# Patient Record
Sex: FEMALE | Race: WHITE | Employment: FULL TIME | URBAN - METROPOLITAN AREA
[De-identification: names, ages, dates, MRNs, and addresses within clinical notes are randomized per-mention and may not be internally consistent; named-entity substitution may affect disease eponyms.]

---

## 2018-05-02 ENCOUNTER — OFFICE VISIT (OUTPATIENT)
Dept: SURGERY | Age: 28
End: 2018-05-02
Payer: COMMERCIAL

## 2018-05-02 VITALS
DIASTOLIC BLOOD PRESSURE: 68 MMHG | SYSTOLIC BLOOD PRESSURE: 98 MMHG | HEIGHT: 68 IN | TEMPERATURE: 98.9 F | BODY MASS INDEX: 17.13 KG/M2 | OXYGEN SATURATION: 98 % | HEART RATE: 69 BPM | WEIGHT: 113 LBS

## 2018-05-02 DIAGNOSIS — S09.92XA NASAL TRAUMA, INITIAL ENCOUNTER: Primary | ICD-10-CM

## 2018-05-02 PROCEDURE — G8427 DOCREV CUR MEDS BY ELIG CLIN: HCPCS | Performed by: PLASTIC SURGERY

## 2018-05-02 PROCEDURE — 99204 OFFICE O/P NEW MOD 45 MIN: CPT | Performed by: PLASTIC SURGERY

## 2018-05-02 PROCEDURE — 1036F TOBACCO NON-USER: CPT | Performed by: PLASTIC SURGERY

## 2018-05-02 PROCEDURE — G8419 CALC BMI OUT NRM PARAM NOF/U: HCPCS | Performed by: PLASTIC SURGERY

## 2018-05-09 ENCOUNTER — TELEPHONE (OUTPATIENT)
Dept: SURGERY | Age: 28
End: 2018-05-09

## 2018-05-12 ENCOUNTER — HOSPITAL ENCOUNTER (OUTPATIENT)
Dept: CT IMAGING | Age: 28
Discharge: HOME | End: 2018-05-14
Payer: COMMERCIAL

## 2018-05-12 DIAGNOSIS — S09.92XA NASAL TRAUMA, INITIAL ENCOUNTER: ICD-10-CM

## 2018-05-12 PROCEDURE — 70486 CT MAXILLOFACIAL W/O DYE: CPT

## 2018-05-16 ENCOUNTER — OFFICE VISIT (OUTPATIENT)
Dept: SURGERY | Age: 28
End: 2018-05-16
Payer: COMMERCIAL

## 2018-05-16 VITALS
WEIGHT: 113 LBS | HEART RATE: 68 BPM | SYSTOLIC BLOOD PRESSURE: 98 MMHG | HEIGHT: 68 IN | DIASTOLIC BLOOD PRESSURE: 60 MMHG | BODY MASS INDEX: 17.13 KG/M2 | OXYGEN SATURATION: 98 %

## 2018-05-16 DIAGNOSIS — S09.92XD NASAL TRAUMA, SUBSEQUENT ENCOUNTER: Primary | ICD-10-CM

## 2018-05-16 PROCEDURE — G8419 CALC BMI OUT NRM PARAM NOF/U: HCPCS | Performed by: PLASTIC SURGERY

## 2018-05-16 PROCEDURE — 99212 OFFICE O/P EST SF 10 MIN: CPT | Performed by: PLASTIC SURGERY

## 2018-05-16 PROCEDURE — G8427 DOCREV CUR MEDS BY ELIG CLIN: HCPCS | Performed by: PLASTIC SURGERY

## 2018-05-16 PROCEDURE — 1036F TOBACCO NON-USER: CPT | Performed by: PLASTIC SURGERY

## 2018-05-22 ENCOUNTER — HOSPITAL ENCOUNTER (OUTPATIENT)
Age: 28
Discharge: HOME | End: 2018-05-24
Payer: COMMERCIAL

## 2018-05-22 ENCOUNTER — OFFICE VISIT (OUTPATIENT)
Dept: FAMILY MEDICINE CLINIC | Age: 28
End: 2018-05-22
Payer: COMMERCIAL

## 2018-05-22 VITALS
BODY MASS INDEX: 17.43 KG/M2 | HEART RATE: 50 BPM | SYSTOLIC BLOOD PRESSURE: 106 MMHG | HEIGHT: 68 IN | OXYGEN SATURATION: 97 % | WEIGHT: 115 LBS | RESPIRATION RATE: 20 BRPM | DIASTOLIC BLOOD PRESSURE: 60 MMHG

## 2018-05-22 DIAGNOSIS — R53.83 FATIGUE, UNSPECIFIED TYPE: Primary | ICD-10-CM

## 2018-05-22 DIAGNOSIS — R53.83 FATIGUE, UNSPECIFIED TYPE: ICD-10-CM

## 2018-05-22 DIAGNOSIS — J30.2 ACUTE SEASONAL ALLERGIC RHINITIS, UNSPECIFIED TRIGGER: ICD-10-CM

## 2018-05-22 LAB
BASOPHILS ABSOLUTE: 0.02 E9/L (ref 0–0.2)
BASOPHILS RELATIVE PERCENT: 0.4 % (ref 0–2)
EOSINOPHILS ABSOLUTE: 0.08 E9/L (ref 0.05–0.5)
EOSINOPHILS RELATIVE PERCENT: 1.8 % (ref 0–6)
HCT VFR BLD CALC: 38.4 % (ref 34–48)
HEMOGLOBIN: 12.1 G/DL (ref 11.5–15.5)
IMMATURE GRANULOCYTES #: 0 E9/L
IMMATURE GRANULOCYTES %: 0 % (ref 0–5)
LYMPHOCYTES ABSOLUTE: 2 E9/L (ref 1.5–4)
LYMPHOCYTES RELATIVE PERCENT: 44.7 % (ref 20–42)
MCH RBC QN AUTO: 30.3 PG (ref 26–35)
MCHC RBC AUTO-ENTMCNC: 31.5 % (ref 32–34.5)
MCV RBC AUTO: 96 FL (ref 80–99.9)
MONOCYTES ABSOLUTE: 0.29 E9/L (ref 0.1–0.95)
MONOCYTES RELATIVE PERCENT: 6.5 % (ref 2–12)
NEUTROPHILS ABSOLUTE: 2.08 E9/L (ref 1.8–7.3)
NEUTROPHILS RELATIVE PERCENT: 46.6 % (ref 43–80)
PDW BLD-RTO: 12.1 FL (ref 11.5–15)
PLATELET # BLD: 339 E9/L (ref 130–450)
PMV BLD AUTO: 9.8 FL (ref 7–12)
RBC # BLD: 4 E12/L (ref 3.5–5.5)
WBC # BLD: 4.5 E9/L (ref 4.5–11.5)

## 2018-05-22 PROCEDURE — 85025 COMPLETE CBC W/AUTO DIFF WBC: CPT

## 2018-05-22 PROCEDURE — 1036F TOBACCO NON-USER: CPT | Performed by: FAMILY MEDICINE

## 2018-05-22 PROCEDURE — 84443 ASSAY THYROID STIM HORMONE: CPT

## 2018-05-22 PROCEDURE — 84703 CHORIONIC GONADOTROPIN ASSAY: CPT

## 2018-05-22 PROCEDURE — 36415 COLL VENOUS BLD VENIPUNCTURE: CPT | Performed by: FAMILY MEDICINE

## 2018-05-22 PROCEDURE — G8427 DOCREV CUR MEDS BY ELIG CLIN: HCPCS | Performed by: FAMILY MEDICINE

## 2018-05-22 PROCEDURE — 82306 VITAMIN D 25 HYDROXY: CPT

## 2018-05-22 PROCEDURE — G8419 CALC BMI OUT NRM PARAM NOF/U: HCPCS | Performed by: FAMILY MEDICINE

## 2018-05-22 PROCEDURE — 99213 OFFICE O/P EST LOW 20 MIN: CPT | Performed by: FAMILY MEDICINE

## 2018-05-22 PROCEDURE — 80053 COMPREHEN METABOLIC PANEL: CPT

## 2018-05-22 RX ORDER — NORETHINDRONE ACETATE AND ETHINYL ESTRADIOL 1MG-20(21)
1 KIT ORAL DAILY
COMMUNITY
End: 2019-07-08

## 2018-05-22 RX ORDER — FLUTICASONE PROPIONATE 50 MCG
1 SPRAY, SUSPENSION (ML) NASAL DAILY
Qty: 1 BOTTLE | Refills: 3 | Status: SHIPPED | OUTPATIENT
Start: 2018-05-22 | End: 2018-10-09 | Stop reason: ALTCHOICE

## 2018-05-22 ASSESSMENT — ENCOUNTER SYMPTOMS
FACIAL SWELLING: 0
VOMITING: 0
PHOTOPHOBIA: 0
WHEEZING: 0
BACK PAIN: 0
BLOOD IN STOOL: 0
APNEA: 0
CHEST TIGHTNESS: 0
COUGH: 0
SINUS PRESSURE: 0
ALLERGIC/IMMUNOLOGIC NEGATIVE: 1
NAUSEA: 0
DIARRHEA: 0
COLOR CHANGE: 0
SORE THROAT: 0
SHORTNESS OF BREATH: 0
RHINORRHEA: 1
ABDOMINAL PAIN: 0

## 2018-05-23 LAB
ALBUMIN SERPL-MCNC: 4.1 G/DL (ref 3.5–5.2)
ALP BLD-CCNC: 26 U/L (ref 35–104)
ALT SERPL-CCNC: 11 U/L (ref 0–32)
ANION GAP SERPL CALCULATED.3IONS-SCNC: 13 MMOL/L (ref 7–16)
AST SERPL-CCNC: 15 U/L (ref 0–31)
BILIRUB SERPL-MCNC: 0.3 MG/DL (ref 0–1.2)
BUN BLDV-MCNC: 10 MG/DL (ref 6–20)
CALCIUM SERPL-MCNC: 9.3 MG/DL (ref 8.6–10.2)
CHLORIDE BLD-SCNC: 102 MMOL/L (ref 98–107)
CO2: 25 MMOL/L (ref 22–29)
CREAT SERPL-MCNC: 0.6 MG/DL (ref 0.5–1)
GFR AFRICAN AMERICAN: >60
GFR NON-AFRICAN AMERICAN: >60 ML/MIN/1.73
GLUCOSE BLD-MCNC: 83 MG/DL (ref 74–109)
HCG QUALITATIVE: NEGATIVE
POTASSIUM SERPL-SCNC: 4.3 MMOL/L (ref 3.5–5)
SODIUM BLD-SCNC: 140 MMOL/L (ref 132–146)
TOTAL PROTEIN: 6.8 G/DL (ref 6.4–8.3)
TSH SERPL DL<=0.05 MIU/L-ACNC: 1.39 UIU/ML (ref 0.27–4.2)
VITAMIN D 25-HYDROXY: 28 NG/ML (ref 30–100)

## 2018-07-20 ENCOUNTER — OFFICE VISIT (OUTPATIENT)
Dept: SURGERY | Age: 28
End: 2018-07-20
Payer: COMMERCIAL

## 2018-07-20 VITALS — WEIGHT: 121 LBS | HEIGHT: 68 IN | RESPIRATION RATE: 16 BRPM | BODY MASS INDEX: 18.34 KG/M2

## 2018-07-20 DIAGNOSIS — S09.92XD NASAL TRAUMA, SUBSEQUENT ENCOUNTER: Primary | ICD-10-CM

## 2018-07-20 PROCEDURE — G8427 DOCREV CUR MEDS BY ELIG CLIN: HCPCS | Performed by: PLASTIC SURGERY

## 2018-07-20 PROCEDURE — 99212 OFFICE O/P EST SF 10 MIN: CPT | Performed by: PLASTIC SURGERY

## 2018-07-20 PROCEDURE — G8419 CALC BMI OUT NRM PARAM NOF/U: HCPCS | Performed by: PLASTIC SURGERY

## 2018-07-20 PROCEDURE — 1036F TOBACCO NON-USER: CPT | Performed by: PLASTIC SURGERY

## 2018-07-26 NOTE — PROGRESS NOTES
Subjective: Follow up today to discuss up coming post-traumatic septorhinoplasty surgery. 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 She understands that this is not a guarantee of results. Have have explained to her in detail my intended pland for how to correct her external nasal deformities and her deviated septum. She did question if she could only reduce her nasal hump. I did educate her that since her nose is slightly long-appearing at baseline, reducing the hump will create the appearance of a longer nose. Furthermore, deepening the radix is necessary as well, or the nasal dorsum would have a linear approach to the the forehead giving an unnatural appearance. I further educated her that cephalic rotation of the tip was necessary to create a slight shortening of the nose. I also explained why osteotomies were necessary as well. She was hoping to reduce the hump only and stated that she \"did not want her appearance to change that much\"    I educated her that this is the technique that I am most comfortable with secondary to my training. Deviation from my plan will lead to unpredictable results and in my opinion an unbalanced nose. I did tell her to get a second opinion from another rhinoplasty surgeon if she wishes. She also wanted to know the exact code to be used in billing and what she would be responsible for. I did let her know my intended code, but that will not be finalized until the procedure is complete. She would have to contact her insurance company for more detailed benefits descriptions. At the end of the 30 min meeting, she seemed comfortable with my surgical plan and intended recovery course.      The risks, benefits and options were discussed with the pt. The risks included but not limited to pain, bleeding, infection, heavy scarring, neurovascular injury, damage to surrounding structures, fluid collections, asymmetry, wound healing complications requiring dressing changes, heart attack, stroke, DVT, PE, contour irregularities, and need for further procedures. No guarantees were given. All of Her questions were answered to Her satisfaction and She agrees to proceed with the operation. Maria D Espinal MD   2:53 PM  7/26/2018.

## 2018-08-03 ENCOUNTER — OFFICE VISIT (OUTPATIENT)
Dept: SURGERY | Age: 28
End: 2018-08-03
Payer: COMMERCIAL

## 2018-08-03 DIAGNOSIS — S09.92XD NASAL TRAUMA, SUBSEQUENT ENCOUNTER: Primary | ICD-10-CM

## 2018-08-03 PROCEDURE — G8419 CALC BMI OUT NRM PARAM NOF/U: HCPCS | Performed by: PLASTIC SURGERY

## 2018-08-03 PROCEDURE — 99212 OFFICE O/P EST SF 10 MIN: CPT | Performed by: PLASTIC SURGERY

## 2018-08-03 PROCEDURE — G8428 CUR MEDS NOT DOCUMENT: HCPCS | Performed by: PLASTIC SURGERY

## 2018-08-03 PROCEDURE — 1036F TOBACCO NON-USER: CPT | Performed by: PLASTIC SURGERY

## 2018-08-03 NOTE — PROGRESS NOTES
Subjective: Follow up today to discuss up coming post-traumatic septorhinoplasty surgery. 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 She did have some further questions about what to expect during recovery. She is hoping to be back to work 2 weeks after surgery. I stated that I expect that this would be a likely scenario barring any unforeseen complications. She does understand how her that she will need to wear nasal tapes for an additional 2 weeks after the splint comes off. Patient states that she feels much better after a second visit discussing the surgery and is much less anxious. The risks, benefits and options were discussed with the pt. The risks included but not limited to pain, bleeding, infection, heavy scarring, neurovascular injury, damage to surrounding structures, fluid collections, asymmetry, wound healing complications requiring dressing changes, heart attack, stroke, DVT, PE, contour irregularities, and need for further procedures. No guarantees were given. All of Her questions were answered to Her satisfaction and She agrees to proceed with the operation. I attest that the patient was seen and examined by me, and concur with the documentation above. I agree with the assessment and the plan outlined. This document is generated, in part, by voice recognition software and thus  syntax and grammatical errors are possible.     Trav White  9:43 AM  8/3/2018